# Patient Record
Sex: MALE | Race: WHITE | NOT HISPANIC OR LATINO | URBAN - NONMETROPOLITAN AREA
[De-identification: names, ages, dates, MRNs, and addresses within clinical notes are randomized per-mention and may not be internally consistent; named-entity substitution may affect disease eponyms.]

---

## 2018-02-05 PROBLEM — H01.024: Noted: 2020-02-10

## 2018-02-05 PROBLEM — Z96.1: Noted: 2018-02-05

## 2018-02-05 PROBLEM — H01.021: Noted: 2020-02-10

## 2018-02-05 PROBLEM — H52.4: Noted: 2018-02-05

## 2019-02-11 ENCOUNTER — IMPORTED ENCOUNTER (OUTPATIENT)
Dept: URBAN - NONMETROPOLITAN AREA CLINIC 1 | Facility: CLINIC | Age: 74
End: 2019-02-11

## 2019-02-11 PROCEDURE — 92014 COMPRE OPH EXAM EST PT 1/>: CPT

## 2019-02-11 PROCEDURE — 92015 DETERMINE REFRACTIVE STATE: CPT

## 2019-02-11 NOTE — PATIENT DISCUSSION
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

## 2020-02-10 ENCOUNTER — IMPORTED ENCOUNTER (OUTPATIENT)
Dept: URBAN - NONMETROPOLITAN AREA CLINIC 1 | Facility: CLINIC | Age: 75
End: 2020-02-10

## 2020-02-10 PROCEDURE — 92015 DETERMINE REFRACTIVE STATE: CPT

## 2020-02-10 PROCEDURE — 92012 INTRM OPH EXAM EST PATIENT: CPT

## 2020-02-10 NOTE — PATIENT DISCUSSION
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

## 2021-02-15 ENCOUNTER — IMPORTED ENCOUNTER (OUTPATIENT)
Dept: URBAN - NONMETROPOLITAN AREA CLINIC 1 | Facility: CLINIC | Age: 76
End: 2021-02-15

## 2021-02-15 ENCOUNTER — PREPPED CHART (OUTPATIENT)
Dept: URBAN - NONMETROPOLITAN AREA CLINIC 1 | Facility: CLINIC | Age: 76
End: 2021-02-15

## 2021-02-15 PROCEDURE — 92014 COMPRE OPH EXAM EST PT 1/>: CPT

## 2021-02-15 PROCEDURE — 92015 DETERMINE REFRACTIVE STATE: CPT

## 2022-03-14 ASSESSMENT — VISUAL ACUITY
OS_SC: 20/20
OD_PH: 20/25-1
OD_SC: 20/40

## 2022-03-14 ASSESSMENT — TONOMETRY
OS_IOP_MMHG: 12
OD_IOP_MMHG: 12

## 2022-03-16 ENCOUNTER — ESTABLISHED PATIENT (OUTPATIENT)
Dept: URBAN - NONMETROPOLITAN AREA CLINIC 1 | Facility: CLINIC | Age: 77
End: 2022-03-16

## 2022-03-16 DIAGNOSIS — H52.4: ICD-10-CM

## 2022-03-16 PROCEDURE — 92015 DETERMINE REFRACTIVE STATE: CPT

## 2022-03-16 PROCEDURE — 92014 COMPRE OPH EXAM EST PT 1/>: CPT

## 2022-03-16 ASSESSMENT — TONOMETRY
OD_IOP_MMHG: 13
OS_IOP_MMHG: 13

## 2022-03-16 ASSESSMENT — VISUAL ACUITY
OD_SC: 20/40
OS_SC: 20/25

## 2022-04-09 ASSESSMENT — VISUAL ACUITY
OU_CC: 20/20
OS_CC: 20/20 SLOW
OD_CC: 20/40
OD_PH: 20/20 SLOW
OS_CC: 20/20-1
OD_CC: 20/29-2
OD_PH: 20/25-
OD_CC: 20/60-
OS_CC: 20/20-

## 2022-04-09 ASSESSMENT — TONOMETRY
OD_IOP_MMHG: 12
OS_IOP_MMHG: 12
OS_IOP_MMHG: 09
OD_IOP_MMHG: 12
OS_IOP_MMHG: 13
OD_IOP_MMHG: 10

## 2024-04-15 ENCOUNTER — ESTABLISHED PATIENT (OUTPATIENT)
Dept: URBAN - NONMETROPOLITAN AREA CLINIC 1 | Facility: CLINIC | Age: 79
End: 2024-04-15

## 2024-04-15 DIAGNOSIS — H52.4: ICD-10-CM

## 2024-04-15 PROCEDURE — 92014 COMPRE OPH EXAM EST PT 1/>: CPT

## 2024-04-15 PROCEDURE — 92015 DETERMINE REFRACTIVE STATE: CPT

## 2024-04-15 ASSESSMENT — VISUAL ACUITY
OU_SC: 20/25-1
OD_BAT: 20/25-2
OS_SC: 20/25-2
OD_SC: 20/25-1
OS_BAT: 20/30

## 2024-04-15 ASSESSMENT — TONOMETRY
OS_IOP_MMHG: 14
OD_IOP_MMHG: 14